# Patient Record
Sex: MALE | Race: WHITE | Employment: FULL TIME | URBAN - METROPOLITAN AREA
[De-identification: names, ages, dates, MRNs, and addresses within clinical notes are randomized per-mention and may not be internally consistent; named-entity substitution may affect disease eponyms.]

---

## 2017-01-06 ENCOUNTER — HOSPITAL ENCOUNTER (EMERGENCY)
Facility: CLINIC | Age: 42
Discharge: HOME | End: 2017-01-07
Attending: FAMILY MEDICINE | Admitting: FAMILY MEDICINE
Payer: COMMERCIAL

## 2017-01-06 VITALS
DIASTOLIC BLOOD PRESSURE: 108 MMHG | SYSTOLIC BLOOD PRESSURE: 159 MMHG | WEIGHT: 202.2 LBS | HEART RATE: 103 BPM | OXYGEN SATURATION: 96 % | BODY MASS INDEX: 29.85 KG/M2 | RESPIRATION RATE: 20 BRPM | TEMPERATURE: 97.6 F

## 2017-01-06 DIAGNOSIS — R09.81 NASAL CONGESTION: ICD-10-CM

## 2017-01-06 DIAGNOSIS — H92.02 OTALGIA, LEFT: ICD-10-CM

## 2017-01-06 DIAGNOSIS — H66.002 LEFT ACUTE SUPPURATIVE OTITIS MEDIA: ICD-10-CM

## 2017-01-06 PROCEDURE — 99282 EMERGENCY DEPT VISIT SF MDM: CPT

## 2017-01-06 PROCEDURE — 99283 EMERGENCY DEPT VISIT LOW MDM: CPT | Performed by: FAMILY MEDICINE

## 2017-01-06 RX ORDER — AZITHROMYCIN 250 MG/1
TABLET, FILM COATED ORAL
Qty: 6 TABLET | Refills: 0 | Status: SHIPPED | OUTPATIENT
Start: 2017-01-06 | End: 2017-01-11

## 2017-01-06 RX ORDER — OXYMETAZOLINE HYDROCHLORIDE 0.05 G/100ML
1 SPRAY NASAL 2 TIMES DAILY
Qty: 1 BOTTLE | Refills: 0 | COMMUNITY
Start: 2017-01-06 | End: 2017-01-09

## 2017-01-06 RX ORDER — IBUPROFEN 200 MG
600 TABLET ORAL EVERY 6 HOURS PRN
Refills: 0 | COMMUNITY
Start: 2017-01-06 | End: 2017-01-09

## 2017-01-06 ASSESSMENT — ENCOUNTER SYMPTOMS
SORE THROAT: 1
CHILLS: 1
RESPIRATORY NEGATIVE: 1

## 2017-01-06 NOTE — ED AVS SNAPSHOT
[facility]    [address] [place] [address]    Phone:  [phone]    Fax:  [phone]                                       Marco Vega   MRN: [id number]    Department:  [facility]   Date of Visit:  1/6/2017           Patient Information     Date Of Birth          1975        Your diagnoses for this visit were:     Left acute suppurative otitis media     Otalgia, left     Nasal congestion        You were seen by Pito Boyd DO.      Follow-up Information     Follow up with Clinic, [facility].    Why:  As needed, if not improved in 3-5 days    Contact information:    [phone]          Follow up with [facility].    Specialty:  EMERGENCY MEDICINE    Why:  If symptoms worsen    Contact information:    [address] [place] [address] [phone]    Additional information:    From [address]: Exit at [address] on [place] side of [place]. Turn right on [address]. Turn left at stoplight on [address]. [facility] will be in view two blocks ahead        Discharge Instructions       Please read and follow the handout(s) instructions. Return, if needed, for increased or worsening symptoms and as directed by the handout(s).    Increase your fluid intake. Drinking small amounts often is the best way to take in more fluids when you are feeling nauseated.    Yogurt orally twice a day while on the antibiotics may help prevent diarrhea and secondary infections caused by the antibiotic use.    Electronically signed, Pito Boyd DO      Discharge References/Attachments     OTITIS MEDIA, ABX TX (ADULT) (ENGLISH)      24 Hour Appointment Hotline       To make an appointment at any [facility], call [phone]). If you don't have a family doctor or clinic, we will help you find one. [facility] ([facility] are conveniently  located to serve the needs of you and your family.             Review of your medicines      START taking        Dose / Directions Last dose taken    azithromycin 250 MG tablet   Commonly known as:  ZITHROMAX Z-KAEL   Quantity:  6 tablet        Two tablets on the first day, then one tablet daily for the next 4 days   Refills:  0        oxymetazoline 0.05 % spray   Commonly known as:  AFRIN NASAL SPRAY   Dose:  1 spray   Quantity:  1 Bottle        Spray 1 spray in nostril 2 times daily for 3 days 1 spray to each nostril twice a day. Do not use for more than 3 days!!!!   Refills:  0          CONTINUE these medicines which may have CHANGED, or have new prescriptions. If we are uncertain of the size of tablets/capsules you have at home, strength may be listed as something that might have changed.        Dose / Directions Last dose taken    ibuprofen 200 MG tablet   Commonly known as:  ADVIL/MOTRIN   Dose:  600 mg   What changed:    - how much to take  - reasons to take this  - additional instructions        Take 3 tablets (600 mg) by mouth every 6 hours as needed for pain or fever (TAKE WITH FOOD) TAKE WITH FOOD AS NEEDED FOR PAIN   Refills:  0                Prescriptions were sent or printed at these locations (3 Prescriptions)                   [facility]   [address] [address]    Telephone:  [phone]   Fax:  [phone]   Hours:                  Not Printed or Sent (2 of 3)         ibuprofen (ADVIL/MOTRIN) 200 MG tablet               oxymetazoline (AFRIN NASAL SPRAY) 0.05 % spray                 These medications are not ready yet because we are checking if your insurance will help you pay for them. Call your pharmacy to confirm that your medication is ready for pickup. It may take up to 24 hours for them to receive the prescription. If the prescription is not ready within 3 business days, please contact your clinic or your provider (1 of 3)         azithromycin  (ZITHROMAX Z-KAEL) 250 MG tablet                Orders Needing Specimen Collection     None      Pending Results     No orders found for last 2 day(s).            Pending Culture Results     No orders found for last 2 day(s).            Thank you for choosing [facility]       Thank you for choosing [facility] for your care. Our goal is always to provide you with excellent care. Hearing back from our patients is one way we can continue to improve our services. Please take a few minutes to complete the written survey that you may receive in the mail after you visit with us. Thank you!        [email or web address] Information     [employer] gives you secure access to your electronic health record. If you see a primary care provider, you can also send messages to your care team and make appointments. If you have questions, please call your primary care clinic.  If you do not have a primary care provider, please call [phone] and they will assist you.        Care EveryWhere ID     This is your Care EveryWhere ID. This could be used by other organizations to access your [facility] medical records  [id number]        After Visit Summary       This is your record. Keep this with you and show to your community pharmacist(s) and doctor(s) at your next visit.

## 2017-01-06 NOTE — ED AVS SNAPSHOT
[facility] Emergency Department    [address] [place] [address]    Phone:  [phone]    Fax:  [phone]                                       Marco Vega   MRN: [id number]    Department:  [facility] Emergency [facility]   Date of Visit:  1/6/2017           After Visit Summary Signature Page     I have received my discharge instructions, and my questions have been answered. I have discussed any challenges I see with this plan with the nurse or doctor.    ..........................................................................................................................................  Patient/Patient Representative Signature      ..........................................................................................................................................  Patient Representative Print Name and Relationship to Patient    ..................................................               ................................................  Date                                            Time    ..........................................................................................................................................  Reviewed by Signature/Title    ...................................................              ..............................................  Date                                                            Time

## 2017-01-07 NOTE — DISCHARGE INSTRUCTIONS
Please read and follow the handout(s) instructions. Return, if needed, for increased or worsening symptoms and as directed by the handout(s).    Increase your fluid intake. Drinking small amounts often is the best way to take in more fluids when you are feeling nauseated.    Yogurt orally twice a day while on the antibiotics may help prevent diarrhea and secondary infections caused by the antibiotic use.    Electronically signed, Pito Boyd DO

## 2017-01-07 NOTE — ED NOTES
Patient c/o left ear pain for the last 2 hours. He reports family has had sore throats and runny noses since [place]. All had - strep test.

## 2017-01-07 NOTE — ED PROVIDER NOTES
History     Chief Complaint   Patient presents with     Otalgia     HPI  Marco Vega is a 41 year old male who presents to the ER with concerns about left sided ear pain that started about 2 hours ago. He states he has had nasal congestion and sore throat for 1 week. He states he had a strept test earlier which was normal.       I have reviewed the Medications, Allergies, Past Medical and Surgical History, and Social History in the Epic system.  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  If need, consider therapy if plan to fly more often.      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 No pertinent past surgical history.    Family History   Problem Relation Age of Onset     DIABETES Maternal Grandmother      HEART DISEASE Maternal Grandmother      HEART DISEASE Maternal Grandfather      CEREBROVASCULAR DISEASE Paternal Grandmother      HEART DISEASE Paternal Grandfather        Social History     Social History     Marital Status:      Spouse Name: N/A     Number of Children: N/A     Years of Education: N/A     Occupational History     Not on file.     Social History Main Topics     Smoking status: Current Every Day Smoker -- 0.50 packs/day     Types: Cigarettes     Smokeless tobacco: Former User      Comment: declined quit plan 5/31/2006     Alcohol Use: Yes      Comment: occ.     Drug Use: No     Sexual Activity:     Partners: Female     Other Topics Concern     Not on file     Social History Narrative       Current Outpatient Rx   Name  Route  Sig  Dispense  Refill     azithromycin (ZITHROMAX Z-KAEL) 250 MG tablet        Two tablets on the first day, then one tablet daily for the next 4 days    6 tablet    0       ibuprofen (ADVIL/MOTRIN) 200 MG  tablet    Oral    Take 3 tablets (600 mg) by mouth every 6 hours as needed for pain or fever (TAKE WITH FOOD) TAKE WITH FOOD AS NEEDED FOR PAIN        0       oxymetazoline (AFRIN NASAL SPRAY) 0.05 % spray    Nasal    Spray 1 spray in nostril 2 times daily for 3 days 1 spray to each nostril twice a day. Do not use for more than 3 days!!!!    1 Bottle    0       No drip 12 hour formula please         Allergies   Allergen Reactions     Penicillins Hives         There is no immunization history on file for this patient.      Review of Systems   Constitutional: Positive for chills.   HENT: Positive for congestion, ear pain and sore throat.    Respiratory: Negative.    All other systems reviewed and are negative.      Physical Exam   BP: (!) 159/108 mmHg  Pulse: 103  Temp: 97.6  F (36.4  C)  Resp: 20  Weight: 91.717 kg (202 lb 3.2 oz)  SpO2: 96 %  Physical Exam   Constitutional: He appears well-developed and well-nourished. He appears distressed (mild).   HENT:   Right Ear: No drainage. Tympanic membrane is injected.   Left Ear: No drainage. Tympanic membrane is injected, erythematous and bulging.   Nose: Mucosal edema and rhinorrhea present.   Mouth/Throat: Posterior oropharyngeal erythema (mild) present. No oropharyngeal exudate, posterior oropharyngeal edema or tonsillar abscesses.   Skin: He is not diaphoretic.   Nursing note and vitals reviewed.      ED Course   Procedures             Critical Care time:  none            Assessments & Plan (with Medical Decision Making)  Left suppurative otitis media found on exam with history of left ear pain x 2 hours with recent URI symptoms x 1 week. He is allergic to penicillin medications.      I have reviewed the nursing notes.    I have reviewed the findings, diagnosis, plan and need for follow up with the patient.    New Prescriptions    AZITHROMYCIN (ZITHROMAX Z-KAEL) 250 MG TABLET    Two tablets on the first day, then one tablet daily for the next 4 days    IBUPROFEN  (ADVIL/MOTRIN) 200 MG TABLET    Take 3 tablets (600 mg) by mouth every 6 hours as needed for pain or fever (TAKE WITH FOOD) TAKE WITH FOOD AS NEEDED FOR PAIN    OXYMETAZOLINE (AFRIN NASAL SPRAY) 0.05 % SPRAY    Spray 1 spray in nostril 2 times daily for 3 days 1 spray to each nostril twice a day. Do not use for more than 3 days!!!!          I verbally discussed the findings of the evaluation today in the ER. I have verbally discussed with Marco the suggested treatment(s) as described in the discharge instructions and handouts. I have prescribed the above listed medications and instructed him on appropriate use of these medications.      I have verbally suggested he follow-up in his clinic or return to the ER for increased symptoms. See the follow-up recommendations documented  in the after visit summary in this visit's EPIC chart.      Final diagnoses:   Left acute suppurative otitis media   Otalgia, left   Nasal congestion       1/6/2017   [facility] EMERGENCY DEPARTMENT      Pito Boyd,   01/06/17 8602

## 2017-01-11 ENCOUNTER — HOSPITAL ENCOUNTER (EMERGENCY)
Facility: CLINIC | Age: 42
Discharge: HOME | End: 2017-01-11
Attending: FAMILY MEDICINE | Admitting: FAMILY MEDICINE
Payer: COMMERCIAL

## 2017-01-11 VITALS
BODY MASS INDEX: 29.95 KG/M2 | DIASTOLIC BLOOD PRESSURE: 98 MMHG | SYSTOLIC BLOOD PRESSURE: 154 MMHG | TEMPERATURE: 97.8 F | RESPIRATION RATE: 16 BRPM | OXYGEN SATURATION: 97 % | HEIGHT: 69 IN | WEIGHT: 202.2 LBS

## 2017-01-11 DIAGNOSIS — H66.91 RIGHT OTITIS MEDIA, UNSPECIFIED CHRONICITY, UNSPECIFIED OTITIS MEDIA TYPE: ICD-10-CM

## 2017-01-11 PROCEDURE — 99284 EMERGENCY DEPT VISIT MOD MDM: CPT | Performed by: FAMILY MEDICINE

## 2017-01-11 PROCEDURE — 99282 EMERGENCY DEPT VISIT SF MDM: CPT

## 2017-01-11 RX ORDER — CEFPROZIL 500 MG/1
500 TABLET, FILM COATED ORAL 2 TIMES DAILY
Qty: 20 TABLET | Refills: 0 | Status: SHIPPED | OUTPATIENT
Start: 2017-01-11

## 2017-01-11 NOTE — ED NOTES
Pt presents with ear pain in both ears.  Pt is hearing ringing in both ears.  Pain is also in both ears.  Pt was here last week for this same issue and was issued a Z-keyona.  Pt has no improvement.

## 2017-01-11 NOTE — DISCHARGE INSTRUCTIONS
Thank you for giving us the opportunity to see you. The impression is that you have an acute right otitis media.  The left eardrum is still affected.    Begin Cefzil 500 mg twice a day for 10 days.    Play some low-level music to help distract from the ringing of the ears.    If you are not seeing an improvement within 7-10 days, please follow up with your primary care provider or with an ear nose throat specialist     Return to the Emergency Department at any time if your symptoms worsen.

## 2017-01-11 NOTE — ED AVS SNAPSHOT
[facility] Emergency Department    [address] [place] [address]    Phone:  [phone]    Fax:  [phone]                                       Marco Vega   MRN: [id number]    Department:  [facility] Emergency Department   Date of Visit:  1/11/2017           After Visit Summary Signature Page     I have received my discharge instructions, and my questions have been answered. I have discussed any challenges I see with this plan with the nurse or doctor.    ..........................................................................................................................................  Patient/Patient Representative Signature      ..........................................................................................................................................  Patient Representative Print Name and Relationship to Patient    ..................................................               ................................................  Date                                            Time    ..........................................................................................................................................  Reviewed by Signature/Title    ...................................................              ..............................................  Date                                                            Time

## 2017-01-11 NOTE — ED PROVIDER NOTES
"                                                            ED Provider Note   CC:     Chief Complaint   Patient presents with     Otalgia        HPI: Marco Vega is a 41 year old male with acute onset of right ear pain that started tonight as she was getting ready for bed.  He was seen 6 days ago with acute onset of left ear pain with ringing in the ear.  He was diagnosed with a acute suppurative otitis and finished this 5 day course of Zithromax yesterday.  His tinnitus has improved, but he is still experiencing some ringing and some discomfort in the left ear.  Tonight as she was getting ready for bed, he laid back in bed, and felt a swishing noise in his right ear consistent with fluid.  He then started to have a itchy dull pain.  Patient does feel congested.    Active Problems:   Patient Active Problem List   Diagnosis     Phobia     Tobacco use disorder       Medications:    No current facility-administered medications on file prior to encounter.  No current outpatient prescriptions on file prior to encounter.    ALLERGIES:    Allergies   Allergen Reactions     Penicillins Hives       Past medical, and social history, along with triage and nursing notes were reviewed.    Review of Systems   Negative except noted in HPI    Physical Exam     Filed Vitals:    01/11/17 0229 01/11/17 0234   BP:  154/98   Temp:  97.8  F (36.6  C)   TempSrc:  Oral   Resp:  16   Height: 1.753 m (5' 9\")    Weight: 91.717 kg (202 lb 3.2 oz)    SpO2:  97%     GENERAL APPEARANCE: in mild distress  FACE: normal facies  EYES: PER;   HENT: Right TM is intact.  There is some erythema over the superior aspect of the TM; left tympanic membrane is dull in appearance  NECK: no adenopathy or asymmetry, thyroid normal to palpation  RESP: lungs are clear to auscultation - no rales, rhonchi or wheezes  CV: regular rates and rhythm  ABD: soft, no tenderness; no rebound or guarding  EXT: warm, good capillary refill  SKIN: no worrisome " rash      Available Lab/Imaging Results   No results found for this or any previous visit (from the past 24 hour(s)).    Impression     Final diagnoses:   Right otitis media       ED Course & Medical Decision Making   Marco Vega presented with acute right ear pain that started this evening.  He was recently diagnosed with acute suppurative otitis in the left ear 6 days ago.  He just finished his Zithromax, and reported only mild to moderate improvement of the ringing in the left ear and the pressure in the left ear.  He still has some dullness and redness of the left tympanic membrane, but now has acute redness of the right eardrum is well.  Patient was given the option of waiting a few more days to see if the Zithromax will take full effect, or begin a different antibiotic.  Patient wanted to start something new to see if it would help.  He has been eating yogurt twice a day and has not had any GI side effects.  Patient will begin Cefzil 500 mg twice a day for 10 days.  I asked him to follow-up in 7-10 days if not improving.  He may need to see an ENT specialist if symptoms persist especially with the tinnitus.        New Prescriptions    CEFPROZIL (CEFZIL) 500 MG TABLET    Take 1 tablet (500 mg) by mouth 2 times daily           Areli Daniels MD  01/11/17 1620

## 2017-01-11 NOTE — ED AVS SNAPSHOT
[facility] Emergency Department    [address] [place] [address]    [place] [address]    Phone:  [phone]    Fax:  [phone]                                       Marco Vega   MRN: [id number]    Department:  [facility]   Date of Visit:  1/11/2017           Patient Information     Date Of Birth          1975        Your diagnoses for this visit were:     Right otitis media        You were seen by Areli Daniels MD.      Follow-up Information     Follow up with Your primary clinic provider or ENT specialist In 1 week.    Why:  if not improving        Discharge Instructions       Thank you for giving us the opportunity to see you. The impression is that you have an acute right otitis media.  The left eardrum is still affected.    Begin Cefzil 500 mg twice a day for 10 days.    Play some low-level music to help distract from the ringing of the ears.    If you are not seeing an improvement within 7-10 days, please follow up with your primary care provider or with an ear nose throat specialist     Return to the Emergency Department at any time if your symptoms worsen.        24 Hour Appointment Hotline       To make an appointment at any [facility], call [phone]). If you don't have a family doctor or clinic, we will help you find one. [facility] clinics are conveniently located to serve the needs of you and your family.             Review of your medicines      START taking        Dose / Directions Last dose taken    cefPROZIL 500 MG tablet   Commonly known as:  CEFZIL   Dose:  500 mg   Quantity:  20 tablet        Take 1 tablet (500 mg) by mouth 2 times daily   Refills:  0                Prescriptions were sent or printed at these locations (1 Prescription)                   [facility]   [address] [place] [address]    Telephone:  [phone]   Fax:  [phone]   Hours:                  These  medications are not ready yet because we are checking if your insurance will help you pay for them. Call your pharmacy to confirm that your medication is ready for pickup. It may take up to 24 hours for them to receive the prescription. If the prescription is not ready within 3 business days, please contact your clinic or your provider (1 of 1)         cefPROZIL (CEFZIL) 500 MG tablet                Orders Needing Specimen Collection     None      Pending Results     No orders found from 1/10/2017 to 1/12/2017.            Pending Culture Results     No orders found from 1/10/2017 to 1/12/2017.            Thank you for choosing [facility]       Thank you for choosing [facility] for your care. Our goal is always to provide you with excellent care. Hearing back from our patients is one way we can continue to improve our services. Please take a few minutes to complete the written survey that you may receive in the mail after you visit with us. Thank you!        [employer] Information     [employer] gives you secure access to your electronic health record. If you see a primary care provider, you can also send messages to your care team and make appointments. If you have questions, please call your primary care clinic.  If you do not have a primary care provider, please call [phone] and they will assist you.        Care EveryWhere ID     This is your Care EveryWhere ID. This could be used by other organizations to access your [facility] medical records  [id number]        After Visit Summary       This is your record. Keep this with you and show to your community pharmacist(s) and doctor(s) at your next visit.

## 2020-02-10 ENCOUNTER — HEALTH MAINTENANCE LETTER (OUTPATIENT)
Age: 45
End: 2020-02-10

## 2020-11-16 ENCOUNTER — HEALTH MAINTENANCE LETTER (OUTPATIENT)
Age: 45
End: 2020-11-16

## 2021-04-03 ENCOUNTER — HEALTH MAINTENANCE LETTER (OUTPATIENT)
Age: 46
End: 2021-04-03

## 2021-09-12 ENCOUNTER — HEALTH MAINTENANCE LETTER (OUTPATIENT)
Age: 46
End: 2021-09-12

## 2022-04-24 ENCOUNTER — HEALTH MAINTENANCE LETTER (OUTPATIENT)
Age: 47
End: 2022-04-24

## 2022-11-19 ENCOUNTER — HEALTH MAINTENANCE LETTER (OUTPATIENT)
Age: 47
End: 2022-11-19

## 2023-06-01 ENCOUNTER — HEALTH MAINTENANCE LETTER (OUTPATIENT)
Age: 48
End: 2023-06-01